# Patient Record
Sex: MALE | Race: OTHER | Employment: OTHER | URBAN - METROPOLITAN AREA
[De-identification: names, ages, dates, MRNs, and addresses within clinical notes are randomized per-mention and may not be internally consistent; named-entity substitution may affect disease eponyms.]

---

## 2021-04-27 ENCOUNTER — APPOINTMENT (OUTPATIENT)
Dept: GENERAL RADIOLOGY | Age: 46
End: 2021-04-27
Attending: NURSE PRACTITIONER

## 2021-04-27 ENCOUNTER — HOSPITAL ENCOUNTER (OUTPATIENT)
Age: 46
Discharge: HOME | End: 2021-04-27

## 2021-04-27 VITALS
HEIGHT: 69 IN | OXYGEN SATURATION: 97 % | HEART RATE: 96 BPM | TEMPERATURE: 99 F | DIASTOLIC BLOOD PRESSURE: 79 MMHG | SYSTOLIC BLOOD PRESSURE: 143 MMHG | RESPIRATION RATE: 18 BRPM | WEIGHT: 172 LBS | BODY MASS INDEX: 25.48 KG/M2

## 2021-04-27 DIAGNOSIS — S80.12XA CONTUSION OF LEFT LOWER EXTREMITY, INITIAL ENCOUNTER: ICD-10-CM

## 2021-04-27 DIAGNOSIS — M25.562 ARTHRALGIA OF LEFT LOWER LEG: Primary | ICD-10-CM

## 2021-04-27 DIAGNOSIS — S82.142A CLOSED FRACTURE OF LEFT TIBIAL PLATEAU, INITIAL ENCOUNTER: ICD-10-CM

## 2021-04-27 DIAGNOSIS — M25.462 EFFUSION OF LEFT KNEE: ICD-10-CM

## 2021-04-27 PROCEDURE — 73560 X-RAY EXAM OF KNEE 1 OR 2: CPT | Performed by: NURSE PRACTITIONER

## 2021-04-27 PROCEDURE — E0114 CRUTCH UNDERARM PAIR NO WOOD: HCPCS | Performed by: NURSE PRACTITIONER

## 2021-04-27 PROCEDURE — L1830 KO IMMOB CANVAS LONG PRE OTS: HCPCS | Performed by: NURSE PRACTITIONER

## 2021-04-27 PROCEDURE — 99204 OFFICE O/P NEW MOD 45 MIN: CPT | Performed by: NURSE PRACTITIONER

## 2021-04-27 PROCEDURE — 73552 X-RAY EXAM OF FEMUR 2/>: CPT | Performed by: NURSE PRACTITIONER

## 2021-04-27 RX ORDER — IBUPROFEN 600 MG/1
600 TABLET ORAL EVERY 8 HOURS PRN
Qty: 30 TABLET | Refills: 0 | Status: SHIPPED | OUTPATIENT
Start: 2021-04-27 | End: 2021-05-04

## 2021-04-27 RX ORDER — TRAMADOL HYDROCHLORIDE 50 MG/1
TABLET ORAL EVERY 6 HOURS PRN
Qty: 20 TABLET | Refills: 0 | Status: SHIPPED | OUTPATIENT
Start: 2021-04-27 | End: 2021-04-27 | Stop reason: ALTCHOICE

## 2021-04-27 RX ORDER — HYDROCODONE BITARTRATE AND ACETAMINOPHEN 5; 325 MG/1; MG/1
1-2 TABLET ORAL EVERY 6 HOURS PRN
Qty: 20 TABLET | Refills: 0 | Status: SHIPPED | OUTPATIENT
Start: 2021-04-27 | End: 2021-05-04

## 2021-04-27 RX ORDER — CYCLOBENZAPRINE HCL 10 MG
10 TABLET ORAL 3 TIMES DAILY PRN
Qty: 20 TABLET | Refills: 0 | Status: SHIPPED | OUTPATIENT
Start: 2021-04-27 | End: 2021-05-04

## 2021-04-28 ENCOUNTER — OFFICE VISIT (OUTPATIENT)
Dept: ORTHOPEDICS CLINIC | Facility: CLINIC | Age: 46
End: 2021-04-28

## 2021-04-28 ENCOUNTER — TELEPHONE (OUTPATIENT)
Dept: ORTHOPEDICS CLINIC | Facility: CLINIC | Age: 46
End: 2021-04-28

## 2021-04-28 DIAGNOSIS — S82.122A CLOSED FRACTURE OF LATERAL PORTION OF LEFT TIBIAL PLATEAU, INITIAL ENCOUNTER: Primary | ICD-10-CM

## 2021-04-28 PROCEDURE — 99243 OFF/OP CNSLTJ NEW/EST LOW 30: CPT | Performed by: ORTHOPAEDIC SURGERY

## 2021-04-28 NOTE — ED INITIAL ASSESSMENT (HPI)
Pt presents to the IC with c/o left leg pain that starts on lateral mid thigh and radiates down to the left anterior shin approx 1 hour ago. Bruise starting to form to the thigh. Pt is able to ambulate, but with limp.

## 2021-04-28 NOTE — TELEPHONE ENCOUNTER
Please review XR- no available ortho until Tuesday 5/4. Ok to wait or should I refer out to another ortho?

## 2021-04-28 NOTE — ED PROVIDER NOTES
Patient Seen in: [facility]      History   Patient presents with:  Leg Pain    Stated Complaint: LEFT LEG INJURY    HPI/Subjective:   Mazin Jenkins is a 70-year-old male presenting to the immediate care with left leg injury.   Patient sta reviewed. Constitutional:       General: He is not in acute distress. Appearance: Normal appearance. He is normal weight. He is not ill-appearing or toxic-appearing. HENT:      Head: Normocephalic and atraumatic.       Right Ear: Tympanic membrane, (1 OR 2 VIEWS), LEFT (CPT=73560)   Final Result    PROCEDURE: XR KNEE (1 OR 2 VIEWS), LEFT (CPT=73560)         COMPARISON: None. INDICATIONS: Pain to distal left femur and knee post injury getting hit     with tree branch today.          TECHNIQUE: performed knee     radiographs. Large joint effusion.                      Dictated by (CST): Shahzad Benites MD on 4/27/2021 at 8:12 PM         Finalized by (CST): Shahzad Benites MD on 4/27/2021 at 8:13 PM                   MDM   39 old male presenting of left knee     Disposition:  Discharge  4/27/2021  8:16 pm    Follow-up:  Mary Vargas MD  [address] [place] [address]  [phone]                Medications Prescribed:  Current Discharge Medication List    START taking these m

## 2021-04-28 NOTE — TELEPHONE ENCOUNTER
S/w Dr. Parson Sayer and he said ok to offer pt today at 11am as well. Called pt and offered him appt today @ 11am and he states that he won't be able to make it that soon today. appt made on 4/30 at 1pm. Directions given.

## 2021-04-28 NOTE — TELEPHONE ENCOUNTER
Pt was seen at [facility] states he has a fractured knee no appts until Monday asking for a sooner appt please advise

## 2021-04-28 NOTE — H&P
NURSING INTAKE COMMENTS: Patient presents with:  Consult: left leg injury-pt states he was trimming a tree and a branch came down and hit his left leg. pt went to  on 4/27 and had XR.       HPI: This 39year old male presents today with complaints of left gain  SKIN: denies worrisome skin lesions  EYES: denies blurred vision or double vision  HEENT: denies new nasal congestion  PULM: denies shortness of breath or cough  CARDIOVASCULAR: denies chest pain  GI: denies emesis, no diarrhea  :  denies dysuria o effusion.      Dictated by (CST): Leydi Malagon MD on 4/27/2021 at 8:12 PM     Finalized by (CST): Leydi Malagon MD on 4/27/2021 at 8:13 PM          XR KNEE (1 OR 2 VIEWS), LEFT (CPT=73560)    Result Date: 4/27/2021  PROCEDURE: XR KNEE (1 OR 2 VIEWS), LEFT (C potential risks such as death, heart attack, stroke, bleeding, infection, blood clot, fracture, stiffness, persistent pain, loose of function as well as numbness or weakness. Patient expressed understanding and desires to proceed with surgery.   Until surg

## 2021-05-03 ENCOUNTER — TELEPHONE (OUTPATIENT)
Dept: ORTHOPEDICS CLINIC | Facility: CLINIC | Age: 46
End: 2021-05-03

## 2021-05-03 NOTE — TELEPHONE ENCOUNTER
Tried to contact patient, to see if he wanted to proceed with surgery. Unable to reach him, left a message for him to call me back.

## (undated) NOTE — LETTER
Date & Time: 4/27/2021, 8:16 PM  Patient: Dre Foster  Encounter Provider(s):    SIMON Beal       To Whom It May Concern:    Dre Foster was seen and treated in our department on 4/27/2021.  He should not return to work until [facility]